# Patient Record
Sex: FEMALE | Race: OTHER
[De-identification: names, ages, dates, MRNs, and addresses within clinical notes are randomized per-mention and may not be internally consistent; named-entity substitution may affect disease eponyms.]

---
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
## 2018-06-11 ENCOUNTER — HOSPITAL ENCOUNTER (EMERGENCY)
Dept: HOSPITAL 80 - FED | Age: 18
Discharge: HOME | End: 2018-06-11
Payer: COMMERCIAL

## 2018-06-11 VITALS — SYSTOLIC BLOOD PRESSURE: 118 MMHG | DIASTOLIC BLOOD PRESSURE: 84 MMHG

## 2018-06-11 DIAGNOSIS — R10.2: Primary | ICD-10-CM

## 2018-06-11 LAB — PLATELET # BLD: 296 10^3/UL (ref 150–400)

## 2018-06-11 NOTE — EDPHY
H & P


Time Seen by Provider: 06/11/18 12:58


HPI/ROS: 





CHIEF COMPLAINT:  Lower abdominal pain





HISTORY OF PRESENT ILLNESS:  17-year-old female presents with severe lower 

abdominal pain.  She was in ballet class, when she developed sudden onset of 

severe lower abdominal cramping, associated with 1 episode of vomiting.  She 

then became hyperventilating and developed numbness and tingling in her arms 

and legs.  The pain increased and she was unable to walk because of severe 

pain.  Pain has subsided now and is localized to the left lower quadrant.  Last 

menstrual period started today.  Not sexually active.  No vaginal discharge or 

urinary symptoms.  No prior similar symptoms.





REVIEW OF SYSTEMS:  complete 10 point ROS negative except at noted in the HPI





Past Medical/Surgical History: 





Depression and anxiety


Migraine headaches





Social History: 





Lives with family





Smoking Status: Never smoked


Physical Exam: 





General Appearance:  Alert, pleasant


Eyes:  Pupils equal and round, no conjunctival pallor 


ENT, Mouth:  Mucous membranes moist


Neck:  Normal inspection


Respiratory:  Lungs are clear to auscultation


Cardiovascular:  Regular rate and rhythm


Gastrointestinal:  Abdomen is soft, left lower quadrant tenderness


Neurological:  A&O, nonfocal, normal gait


Skin:  Warm and dry


Extremities:  Normal inspection


Psychiatric:  Mood and affect normal





Constitutional: 


 Initial Vital Signs











Temperature (C)  36.9 C   06/11/18 12:36


 


Heart Rate  59 L  06/11/18 12:36


 


Respiratory Rate  19   06/11/18 12:36


 


Blood Pressure  130/73 H  06/11/18 12:36


 


O2 Sat (%)  100   06/11/18 12:36








 











O2 Delivery Mode               Room Air














Allergies/Adverse Reactions: 


 





amoxicillin Allergy (Verified 03/14/17 11:29)


 


zolmitriptan [From Zomig] Allergy (Verified 03/14/17 11:29)


 








Home Medications: 














 Medication  Instructions  Recorded


 


Albuterol  11/22/15


 


Qvar 40 (RX)  11/22/15


 


Zertec  11/22/15


 


EPINEPHRINE [EPIPEN] 0.3 mg IM ONCE #2 syr 01/25/16


 


predniSONE 60 mg PO DAILY #9 tab 01/25/16














Medical Decision Making





- Diagnostics


Imaging Results: 


 Imaging Impressions





Pelvic/Renal Ultrasound  06/11/18 13:40


Impression: Normal.


 


Findings were discussed with JOSHUA EMERY MD at 14:21, on 6/11/2018.


 











ED Course/Re-evaluation: 





This patient presents with acute onset pelvic pain, with left lower quadrant 

tenderness on exam.  Stat pregnancy test is negative.  Ultrasound ordered to 

rule out ovarian cyst/torsion.  Toradol 15 mg IV given for pain control.





Ultrasound is normal, no evidence of ovarian cysts or free fluid.  Feels better 

after IV Toradol.  Symptoms are likely secondary to menstrual period.  

Abdominal exam is benign.  Abdominal pain precautions given.  Safe and stable 

for discharge home.





Differential Diagnosis: 





Differential diagnosis includes though it is not limited to ectopic pregnancy, 

ovarian cyst, ovarian torsion, PID, UTI, appendicitis.





- Data Points


Laboratory Results: 


 Laboratory Results





 06/11/18 13:15 





 











  06/11/18 06/11/18 06/11/18





  13:48 13:15 13:15


 


WBC      10.85 10^3/uL H 10^3/uL





     (3.80-9.50) 


 


RBC      6.11 10^6/uL H 10^6/uL





     (4.18-5.33) 


 


Hgb      15.8 g/dL g/dL





     (12.6-16.3) 


 


Hct      48.4 % H %





     (38.0-47.0) 


 


MCV      79.2 fL L fL





     (81.5-99.8) 


 


MCH      25.9 pg L pg





     (27.9-34.1) 


 


MCHC      32.6 g/dL g/dL





     (32.4-36.7) 


 


RDW      13.9 % %





     (11.5-15.2) 


 


Plt Count      296 10^3/uL 10^3/uL





     (150-400) 


 


MPV      9.2 fL fL





     (8.7-11.7) 


 


Neut % (Auto)      77.4 % H %





     (39.3-74.2) 


 


Lymph % (Auto)      15.9 % %





     (15.0-45.0) 


 


Mono % (Auto)      3.8 % L %





     (4.5-13.0) 


 


Eos % (Auto)      1.1 % %





     (0.6-7.6) 


 


Baso % (Auto)      0.6 % %





     (0.3-1.7) 


 


Nucleat RBC Rel Count      0.0 % %





     (0.0-0.2) 


 


Absolute Neuts (auto)      8.40 10^3/uL H 10^3/uL





     (1.70-6.50) 


 


Absolute Lymphs (auto)      1.73 10^3/uL 10^3/uL





     (1.00-3.00) 


 


Absolute Monos (auto)      0.41 10^3/uL 10^3/uL





     (0.30-0.80) 


 


Absolute Eos (auto)      0.12 10^3/uL 10^3/uL





     (0.03-0.40) 


 


Absolute Basos (auto)      0.06 10^3/uL 10^3/uL





     (0.02-0.10) 


 


Absolute Nucleated RBC      0.00 10^3/uL 10^3/uL





     (0-0.01) 


 


Immature Gran %      1.2 % H %





     (0.0-1.1) 


 


Immature Gran #      0.13 10^3/uL H 10^3/uL





     (0.00-0.10) 


 


Beta HCG, Qual    NEGATIVE   





    


 


Urine Color  YELLOW     





    


 


Urine Appearance  CLEAR     





    


 


Urine pH  6.0     





   (5.0-7.5)   


 


Ur Specific Gravity  1.018     





   (1.002-1.030)   


 


Urine Protein  NEGATIVE     





   (NEGATIVE)   


 


Urine Ketones  1+  H     





   (NEGATIVE)   


 


Urine Blood  3+  H     





   (NEGATIVE)   


 


Urine Nitrate  NEGATIVE     





   (NEGATIVE)   


 


Urine Bilirubin  NEGATIVE     





   (NEGATIVE)   


 


Urine Urobilinogen  NEGATIVE EU EU    





   (0.2-1.0)   


 


Ur Leukocyte Esterase  NEGATIVE     





   (NEGATIVE)   


 


Urine RBC   /hpf H /hpf    





   (0-3)   


 


Urine WBC  1-3 /hpf /hpf    





   (0-3)   


 


Ur Epithelial Cells  TRACE /lpf /lpf    





   (NONE-1+)   


 


Hyaline Casts  25-50 /lpf H /lpf    





   (0-1)   


 


Urine Mucus  2+ /lpf H /lpf    





   (NONE-1+)   


 


Urine Glucose  NEGATIVE     





   (NEGATIVE)   











Medications Given: 


 








Discontinued Medications





Ketorolac Tromethamine (Toradol)  15 mg IVP EDNOW ONE


   Stop: 06/11/18 13:47


   Last Admin: 06/11/18 14:23 Dose:  15 mg








Departure





- Departure


Disposition: Home, Routine, Self-Care


Clinical Impression: 


 Pelvic pain in female





Condition: Good


Instructions:  Pelvic Pain in Women (ED)


Additional Instructions: 


Ibuprofen 400 mg 3 times daily while the pain persists.





Referrals: 


Jossue Merchant DO [Medical Doctor] - As per Instructions

## 2019-04-27 ENCOUNTER — HOSPITAL ENCOUNTER (EMERGENCY)
Dept: HOSPITAL 80 - FED | Age: 19
Discharge: HOME | End: 2019-04-27
Payer: COMMERCIAL

## 2019-04-27 VITALS — SYSTOLIC BLOOD PRESSURE: 118 MMHG | DIASTOLIC BLOOD PRESSURE: 95 MMHG

## 2019-04-27 DIAGNOSIS — J45.909: ICD-10-CM

## 2019-04-27 DIAGNOSIS — Z79.899: ICD-10-CM

## 2019-04-27 DIAGNOSIS — R11.2: Primary | ICD-10-CM

## 2019-04-27 DIAGNOSIS — N83.201: ICD-10-CM

## 2019-04-27 DIAGNOSIS — R10.30: ICD-10-CM

## 2019-04-27 DIAGNOSIS — E86.9: ICD-10-CM

## 2019-04-27 LAB — PLATELET # BLD: 252 10^3/UL (ref 150–400)

## 2019-04-27 NOTE — EDPHY
H & P


Stated Complaint: N/V gen stomach cramps, h/a


Time Seen by Provider: 04/27/19 07:25


HPI/ROS: 





CHIEF COMPLAINT:  Nausea vomiting





HISTORY OF PRESENT ILLNESS:  This is a 18-year-old female presents with her 

mother with report that around 4:00 a.m. This morning she woke with a 

generalized abdominal pain followed by nausea and vomiting.  No diarrhea.  No 

fever.  No other ill contacts.  No suspect food.  Patient reports she has not 

been able to keep anything down.


Patient does have a history of asthma and recently was on a prednisone taper 

secondary to a flare of her asthma .  She had 60 mg x3 days, 40 mg times 3 days 

then 20 mg x1.


Patient denies any alcohol, marijuana, illicit drugs.  Last menstrual period 

was April 11th.  Patient did receive a meningococcal vaccination yesterday at 

her primary care physician's office.


Also complaining of a headache.








REVIEW OF SYSTEMS: 


A comprehensive 10 system review of systems was reviewed and is otherwise 

negative aside from elements mentioned in the history of present illness and 

medical decision making.





PAST MEDICAL HISTORY:  Depression, anxiety, panic attacks, asthma





SOCIAL HISTORY:  Here with her mother.  Currently is a ballet dancer with 

[employer].





VITAL SIGNS Reviewed by me.


GENERAL:  Well-developed, well-nourished, resting comfortably in no respiratory 

distress.  Somewhat quiet.


HEENT:  Atraumatic.  Eyes:  No icterus, no injection. Mouth:  moist mucous 

membranes.  No erythema or lesions. Neck:  supple with no adenopathy.  No 

meningismus.  Negative Kernig's.  Negative Brudzinski's.


LUNGS:  Clear to auscultation bilaterally, no wheezes, rhonchi or rales.


CARDIAC:  Regular rate and rhythm, no rubs, murmurs or gallops.


ABDOMEN:  Soft, mild suprapubic right and left lower quadrant pain.  No 

guarding or rebound.  Nondistended.  


BACK:  No CVA tenderness.


EXTREMITIES:  No trauma. No edema.  Range of motion is normal throughout.


NEURO:  Alert and oriented,  grossly nonfocal.


SKIN:  Warm and dry, no rash.


PSYCHIATRIC:  Normal mentation, no agitation.





- Personal History


LMP (Females 10-55): 1-7 Days Ago





- Medical/Surgical History


Hx Asthma: Yes


Hx Chronic Respiratory Disease: No


Hx Diabetes: No


Hx Cardiac Disease: No


Hx Renal Disease: No


Hx Cirrhosis: No


Hx Alcoholism: No


Hx HIV/AIDS: No


Hx Splenectomy or Spleen Trauma: No


Other PMH: Asthma.  depression





- Social History


Smoking Status: Never smoked


Constitutional: 


 Initial Vital Signs











Temperature (C)  36.6 C   04/27/19 07:20


 


Heart Rate  72   04/27/19 07:20


 


Respiratory Rate  24 H  04/27/19 07:20


 


Blood Pressure  107/65   04/27/19 07:20


 


O2 Sat (%)  100   04/27/19 07:20








 











O2 Delivery Mode               Room Air














Allergies/Adverse Reactions: 


 





amoxicillin Allergy (Verified 03/14/17 11:29)


 


zolmitriptan [From Zomig] Allergy (Verified 03/14/17 11:29)


 








Home Medications: 














 Medication  Instructions  Recorded


 


Albuterol  04/27/19


 


Ondansetron Odt [Zofran Odt 4 mg 4 mg PO Q6 PRN #8 tab 04/27/19





(RX)]  


 


Qvar 80 Redihaler (*)  04/27/19














Medical Decision Making





- Diagnostics


Imaging Results: 





 Pelvic US


Impression: 


1. Echogenic lesion within the endometrial canal near the uterine body to 

fundus suspicious for 


small polyp. 


2. Otherwise, normal appearing uterus with incidental follicular cyst right 

ovary. 


 


Findings discussed with Olga Estrada MD  at  11:02 hour, 4/27/2019. 


 


               Dictated By: Ed Pickard MD 





Abd CT for appendicitis


Impression: 


1. Negative ultrasound right lower quadrant. If there is high clinical concern 

for appendicitis 


consider CT as clinically directed. 


 


Findings discussed with Olga Estrada MD  at  11:04 hour, 4/27/2019. 


 


               Dictated By: Ed Pickard MD 


 


CT Scan abd


Impression: 


1. Normal CT abdomen and pelvis with contrast enhancement. 


2. No CT evidence of appendicitis, abscess or bowel obstruction. 


3. Mild constipation 


 


Findings discussed with Olga Estrada MD  at  12:16 hour, 4/27/2019. 


 


               Dictated By: Ed Pickard MD 


 





Imaging: Discussed imaging studies w/ On call Radiologist


ED Course/Re-evaluation: 





18-year-old female presented emergency department with several hours of 

significant abdominal discomfort, vomiting.  On exam she does have some 

palpable lower abdominal discomfort.





IV was placed and patient received normal saline as well as Zofran.  On re-

examination her nausea has improved slightly, however she still continues to 

have discomfort[place]  [place] [address]. No fever.  Abdomen remains mildly 

tender in the right and left lower quadrants as well as in the suprapubic 

region.





Ultrasound was ordered to evaluate for ovarian cysts as well as to evaluate for 

appendicitis.  No significant ovarian cysts were noted, although the patient 

does have an incidental follicular cyst on the right.  The appendix was not 

able to be visualized although no secondary signs of appendicitis were 

identified.





I discussed these findings with the patient as well as with her parents.  Again 

on examination the patient reports ongoing right lower quadrant discomfort and 

some suprapubic discomfort.  CT scan abdomen pelvis was then ordered.  This was 

negative for any appendicitis.





Patient was discharged home with instructions regarding Tylenol, ibuprofen, as 

well as given a prescription for Zofran.  In the emergency department she had 

received fentanyl initially and Toradol during her course for pain.


Differential Diagnosis: 





The differential diagnosis for the patient's abdominal pain was considered 

including but not limited to ovarian cyst, pelvic inflammatory disease, ovarian 

torsion, urinary tract infection, pregnancy related complications, and 

appendicitis.





- Data Points


Laboratory Results: 


 Laboratory Results





 04/27/19 07:35 





 04/27/19 07:35 








Medications Given: 


 








Discontinued Medications





Fentanyl (Sublimaze)  50 mcg IVP EDNOW ONE


   Stop: 04/27/19 11:22


   Last Admin: 04/27/19 11:28 Dose:  50 mcg


Sodium Chloride (Ns)  1,000 mls @ 0 mls/hr IV ONCE ONE


   PRN Reason: Wide Open


   Stop: 04/27/19 07:39


   Last Admin: 04/27/19 07:39 Dose:  1,000 mls


Sodium Chloride (Ns)  1,000 mls @ 0 mls/hr IV EDNOW ONE; Wide Open


   PRN Reason: Protocol


   Stop: 04/27/19 07:45


   Last Admin: 04/27/19 11:08 Dose:  Not Given


Ketorolac Tromethamine (Toradol)  30 mg IM EDNOW ONE


   Stop: 04/27/19 12:34


   Last Admin: 04/27/19 12:35 Dose:  30 mg


Ondansetron HCl (Zofran)  4 mg IVP EDNOW ONE


   Stop: 04/27/19 07:39


   Last Admin: 04/27/19 07:39 Dose:  4 mg


Promethazine HCl (Phenergan)  6.25 mg IVP ONCE ONE


   Stop: 04/27/19 11:22


   Last Admin: 04/27/19 11:28 Dose:  6.25 mg








Departure





- Departure


Disposition: Home, Routine, Self-Care


Clinical Impression: 


Abdominal pain


Qualifiers:


 Abdominal location: lower abdomen, unspecified Qualified Code(s): R10.30 - 

Lower abdominal pain, unspecified





Vomiting


Qualifiers:


 Vomiting type: unspecified Vomiting Intractability: non-intractable Nausea 

presence: with nausea Qualified Code(s): R11.2 - Nausea with vomiting, 

unspecified





Ovarian cyst


Qualifiers:


 Laterality: right Qualified Code(s): N83.201 - Unspecified ovarian cyst, right 

side





Condition: Good


Instructions:  Ovarian Cyst (ED), Acute Nausea and Vomiting (ED)


Additional Instructions: 


Okay to use Tylenol if needed for abdominal discomfort.





You been given a prescription for Zofran.  Please use as needed for ongoing 

nausea vomiting.





Please get plenty of rest.





For your nausea vomiting,  I suggested you start with a bland diet and advance 

as tolerated.


This means start with clear liquids such as water, Gatorade, juice, flat non-

caffeinated soda.


If you tolerate clear liquids,  then you may add bland foods such as bananas, 

rice, or toast.


If you do not have any worsening of your symptoms, you may begin to resume a 

regular diet.





Treatment for ovarian cyst consists mainly of pain medications.  Ibuprofen may 

be slightly better than Tylenol but it may also cause some nausea.  Please take 

ibuprofen if desired with food.


Referrals: 


Flor De La Vega MD [Primary Care Provider] - As per Instructions


Prescriptions: 


Ondansetron Odt [Zofran Odt 4 mg (RX)] 4 mg PO Q6 PRN #8 tab


 PRN Reason: Nausea

## 2019-07-01 ENCOUNTER — HOSPITAL ENCOUNTER (EMERGENCY)
Dept: HOSPITAL 80 - FED | Age: 19
Discharge: HOME | End: 2019-07-01
Payer: COMMERCIAL